# Patient Record
Sex: FEMALE | Race: WHITE | Employment: UNEMPLOYED | URBAN - METROPOLITAN AREA
[De-identification: names, ages, dates, MRNs, and addresses within clinical notes are randomized per-mention and may not be internally consistent; named-entity substitution may affect disease eponyms.]

---

## 2022-11-01 ENCOUNTER — HOSPITAL ENCOUNTER (OUTPATIENT)
Dept: PHYSICAL THERAPY | Age: 62
Discharge: HOME | End: 2022-11-01
Payer: OTHER GOVERNMENT

## 2022-11-01 PROCEDURE — 97530 THERAPEUTIC ACTIVITIES: CPT

## 2022-11-01 PROCEDURE — 97161 PT EVAL LOW COMPLEX 20 MIN: CPT

## 2022-11-01 NOTE — PROGRESS NOTES
[facility] [address] [facility] [address] [place] [address]. [address] [place], [place] [address]  Phone: [phone] Fax: [phone] / STATEMENT OF MEDICAL NECESSITY FOR PHYSICAL THERAPY SERVICES  Patient Name: Desi Almeida : 1960   Medical   Diagnosis: Left hip pain [M25.552]  Right hip pain [M25.551] Treatment Diagnosis: B hip pain   Onset Date:      Referral Source: Niki Agosto MD [facility]): 2022   Prior Hospitalization: See medical history Provider #: [id number]   Prior Level of Function: Walking (new puppy), Gardening, Weight-lifting    Comorbidities: Osteoporosis, Arthritis, Depression, Scoliosis   Medications: Verified on Patient Summary List   The Plan of Care and following information is based on the information from the initial evaluation.   ========================================================================  Assessment / key information:  Pt is a 57 yo female presenting with B hip pain s/sx consistent with greater trochanteric bursitis. Pt presents with pain localized to R greater trochanter, B genu valgus, decr glute strength B, decr B hip flexibility, and pain with laying on R side. Her B hip and knee AROM is WNL and she is (-) for neuro signs and red flags at this time. Pain ranges from 0-6/10, improves with exercise and stretching, but pt unable to pinpoint relieving stretches. Functional deficits at this time include sleeping, prolonged walking, and fear of falling. Pt would benefit from skilled PT to address these deficits to assist pt to full return to PLOF activity. HEP initiated w/ good pt tolerance noted.  Further assessment as follows:    Posture:  Genu valgus B  Lateral Shift:   (-)    Mild L Lumbar scoliotic curve, R shoulder elevated     Gait:    WNL, Independent     Active Movements:   Lumbar, Hip AROM: WNL, painfree      ROM / Strength:                                           AROM Strength (1-5)      Left Right Left Right   Hip Flexion  WNL   WNL  4 4-     Extension       4- 4      Abduction      4- 4      Adduction             IR  35 35   5  5     ER 24   20  5  5   Knee Flexion WNL   WNL  4- 5     Extension  WNL  WNL  5 5    Ankle Plantarflexion             Dorsiflexion     5   5      Neuro Screen:   WNL     Palpation:  TTP at R greater trochanter   Crepitus at R patella with knee AROM     Special Tests:                        Sacroilliac:     Compression:  (-) B                                        Crests: Even height B                          ASIS: Even height B          Hip:            Scour: (-) B  MICHELLE:   (+) L                                                    Piriformis:   ER (+) on L, IR (+) R                              Trendelenburg: (-) B                          Leg Length: equal B                                        Flexibility:   Yimi's:  (-) L  Theodore: (+) L      Ely's: (-) B            Hamstring 90/90: (-) B     Balance:  SLS on floor, EO: R: 18 sec;  L: 30 sec  SLS on floor, EC: R: 2 sec;  L: 3 sec     Other Tests/Comments:  Bridge: 100% no pain, quad dominant  30sec STS: x12, from low plinth, narrow LUIS, mild B DKV, pain in R knee              FOTO: 67/100    ========================================================================  Eval Complexity: History: HIGH Complexity :3+ comorbidities / personal factors will impact the outcome/ POC Exam:HIGH Complexity : 4+ Standardized tests and measures addressing body structure, function, activity limitation and / or participation in recreation  Presentation: LOW Complexity : Stable, uncomplicated  Clinical Decision Making:MEDIUM Complexity : FOTO score of 26-74Overall Complexity:LOW   Problem List: pain affecting function, decrease strength, decrease activity tolerance, and decrease flexibility/ joint mobility    Treatment Plan may include any combination of the following: Therapeutic exercise, Neuromuscular reeducation, Manual therapy, Therapeutic activity, Self care/home management, Electric stim unattended , Gait training, and Ultrasound  Patient / Family readiness to learn indicated by: asking questions, trying to perform skills, and interest  Persons(s) to be included in education: patient (P)  Barriers to Learning/Limitations: None  Measures taken:    Patient Goal (s): \"Increase ROM\"   Patient self reported health status: good  Rehabilitation Potential: excellent    Short Term Goals: To be accomplished in  1  week:   1. Pt will be compliant with initial HEP to improve ability to independently manage symptoms. Status at last assessment: reviewed and initiated HEP    Long Term Goals: To be accomplished in  4 weeks:  1. Pt to report FOTO score of at least 74/100 pts to demonstrate improved function and quality of life. Status at last assessment: 67/100  2. Pt to demo B hip ext and abd strength of 5/5 for improved pelvic stability and ease with transfers, stairs, and weight lifting. Status at last assessment: L: 4-/5, R: 4/5   3. Pt to demo 30 sec STS of greater than 12 reps with proper mechanics and no pain for improved functional LE and hip strength. Status at last assessment: x12, from low plinth, narrow LUIS, mild B DKV, pain in R knee     4. Pt to report pain no greater than 4/10 at worst in order to sleep without interruption. Status at last assessment: 6/10 at worst when laying on R side      Frequency / Duration:   Patient to be seen  1  times per week for 4  treatments:     Patient / Caregiver education and instruction: activity modification and exercises  Therapist Signature: Jose Minaya PT Date: 44/7/7771   Certification Period: na Time: 8:26 AM   ========================================================================  I certify that the above Physical Therapy Services are being furnished while the patient is under my care. I agree with the treatment plan and certify that this therapy is necessary.   Physician Signature:        Date:       Time: ___                                            Myla Auguste MD.    Please sign and return to [facility] or you may fax the signed copy to [phone]. Thank you.

## 2022-11-01 NOTE — PROGRESS NOTES
PT DAILY TREATMENT NOTE/LUMBAR EVAL     Patient Name: Luis Carlos Mcelroy  Date:2022  : 1960  [x]  Patient  Verified  Payor:  / Plan: Donte Gaytan 74 / Product Type:  /    In time:1455  Out time:1556  Total Treatment Time (min): 61  Visit #: 1 of 4    Treatment Area:   Left hip pain [M25.552]  Right hip pain [M25.551]     SUBJECTIVE    CC: B hip pain, pain in R knee  Pain: Current: 2/10 Best: 0/10 Worst: 6/10   Alleviated By: rolling out, exercise, stretching   Aggravated By: weather, stretching   PLOF: Walking (new puppy), Gardening, Weight-lifting   Recent Falls: Yes, dog ran into, fell onto bottom  Mechanism of Injury: Insidious  Current symptoms/Complaints:  Worsening hip pain over last 1-2 years, joint stiffness, decr ROM, pain worse in morning  Previous Treatment/Compliance: PT  PMHx/Surgical Hx: L tibial realignment, surgery for torn ligaments, dislocated knee caps (\"It's been awhile\")  Diagnostic Tests: [] Lab work [x] X-rays    [] CT [] MRI     [] Other:   Results: R hip \"a couple years ago, impingement, did stretching\"  Work Hx: Retired  Living Situation: 1 flight to enter, 2 flights in the home, lives with , uses handrail   Pt Goals: \"increase ROM\"       Modality rationale: decrease inflammation and decrease pain to improve the patients ability to walk    Min Type Additional Details    [] Estim:  []Unatt       []IFC  []Premod                        []Other:  []w/ice   []w/heat  Position:  Location:    [] Estim: []Att    []TENS instruct  []NMES                    []Other:  []w/US   []w/ice   []w/heat  Position:  Location:    []  Traction: [] Cervical       []Lumbar                       [] Prone          []Supine                       []Intermittent   []Continuous Lbs:  [] before manual  [] after manual    []  Ultrasound: []Continuous   [] Pulsed                           []1MHz   []3MHz Location:  W/cm2:    []  Iontophoresis with dexamethasone         Location: [] Take home patch   [] In clinic   10 [x]  Ice to R hip    [x]  Heat to R thigh  []  Ice massage  []  Laser   []  Anodyne Position: Semi-reclined  Location:    []  Laser with stim  []  Other: Position:  Location:    []  Vasopneumatic Device Pressure:       [] lo [] med [] hi   Temperature: [] lo [] med [] hi   [x] Skin assessment post-treatment:  [x]intact []redness- no adverse reaction    []redness - adverse reaction:     39 min [x]Eval                  []Re-Eval     12 min Therapeutic Activity:  [x]  See flow sheet :   Rationale: increase ROM and increase strength  to improve the patients ability to walk her dog and perform transfers            Throughout Patient Education:   Pt educated on diagnosis and prognosis, current impairments, postural re-education, goals/role/intent of therapy, and importance of compliance with HEP. Written HEP provided to patient and PT instructed pt on performance. Pt demo good understanding and recall. PT addressed pt questions/concerns regarding their condition. Return to gym, OK to weightlift, use stair climber and incline TM. Avoid quad dominant activity    Ice to R hip to decr bursitis-related pain sx after prolonged walking         General Health:  Red Flags Indicated?  [] Yes    [x] No      OBJECTIVE    Posture:  Genu valgus B,   Lateral Shift:   (-)    Mild L Lumbar scoliotic curve, R shoulder elevated     Gait:    WNL, Independent     Active Movements:   Lumbar, Hip AROM: WNL, painfree      ROM / Strength:                                           AROM                               Strength (1-5)      Left Right Left Right   Hip Flexion  WNL   WNL  4 4-     Extension       4- 4      Abduction      4- 4      Adduction             IR  35 35   5  5     ER 24   20  5  5   Knee Flexion WNL   WNL  4- 5     Extension  WNL  WNL  5 5    Ankle Plantarflexion             Dorsiflexion     5   5      Neuro Screen:   WNL     Palpation:  TTP at R greater trochanter   Crepitus at R patella with knee AROM     Special Tests:                        Sacroilliac:     Compression:  (-) B                                        Crests: Even height B                          ASIS: Even height B          Hip:            Scour: (-) B  MICHELLE:   (+) L                                                    Piriformis:   ER (+) on L, IR (+) R                              Trendelenburg: (-) B                          Leg Length: equal B                                        Flexibility:   Yimi's:  (-) L  Theodore: (+) L      Ely's: (-) B            Hamstring 90/90: (-) B     Balance:  SLS on floor, EO: R: 18 sec;  L: 30 sec  SLS on floor, EC: R: 2 sec;  L: 3 sec     Other Tests/Comments:  Bridge: 100% no pain, quad dominant  30sec STS: x12, from low plinth, narrow LUIS, mild B DKV, pain in R knee              FOTO: 67/100    Pain Level (0-10 scale) post treatment: 0/10    ASSESSMENT/Changes in Function:   Please see POC    Patient will continue to benefit from skilled PT services to modify and progress therapeutic interventions, address functional mobility deficits, address ROM deficits, address strength deficits, analyze and address soft tissue restrictions, analyze and cue movement patterns, analyze and modify body mechanics/ergonomics, assess and modify postural abnormalities, address imbalance/dizziness, and instruct in home and community integration to attain remaining goals.      [x]  See Plan of Care   []  See progress note/recertification  []  See Discharge Summary         Progress towards goals / Updated goals:  Please see POC    Justification for Eval Code Complexity:  Patient History : see above  Examination: see exam  Clinical Presentation:  stable  Clinical Decision Making : FOTO : 67 /100      PLAN  [x]  Upgrade activities as tolerated     [x]  Continue plan of care  []  Update interventions per flow sheet       []  Discharge due to:_  [x]  Other:_   POC 1x/wk for 4 weeks    Karis Schlatter, PT 11/1/2022  8:01 AM

## 2022-11-07 ENCOUNTER — HOSPITAL ENCOUNTER (OUTPATIENT)
Dept: PHYSICAL THERAPY | Age: 62
Discharge: HOME | End: 2022-11-07
Payer: OTHER GOVERNMENT

## 2022-11-07 PROCEDURE — 97116 GAIT TRAINING THERAPY: CPT

## 2022-11-07 PROCEDURE — 97112 NEUROMUSCULAR REEDUCATION: CPT

## 2022-11-07 PROCEDURE — 97530 THERAPEUTIC ACTIVITIES: CPT

## 2022-11-07 PROCEDURE — 97110 THERAPEUTIC EXERCISES: CPT

## 2022-11-07 NOTE — PROGRESS NOTES
PT DAILY TREATMENT NOTE 8    Patient Name: Jose Echevarria  Date:2022  : 1960  [x]  Patient  Verified  Payor: MARGRET / Plan: Donte Gaytan 74 / Product Type:  /    In time: 8480  Out time:1455  Total Treatment Time (min): 70  Visit #: 2 of 4    Treatment Area:   Left hip pain [M25.552]  Right hip pain [M25.551]    SUBJECTIVE  Pain Level (0-10 scale): 1/10 on R hip   Any medication changes, allergies to medications, adverse drug reactions, diagnosis change, or new procedure performed?: [x] No    [] Yes (see summary sheet for update)  Subjective functional status/changes:   [] No changes reported  \"I have been icing it every other day as much as I don't like to. When I am coming in from walking, I do the stretches because I think it's the walking that gets me. I have not done any biking or elliptical this week. \"    OBJECTIVE    Modality rationale: decrease pain and increase tissue extensibility to improve the patients ability to perform ADLs and rest comfortably following therapy.    Min Type Additional Details     [] Estim:  []Unatt       []IFC  []Premod                        []Other: []w/ice   []w/heat  Position:   Location:      [] Estim: []Att    []TENS instruct  []NMES                    []Other: []w/US   []w/ice   []w/heat  Position:  Location:      []  Traction: [] Cervical       []Lumbar                       [] Prone          []Supine                       []Intermittent   []Continuous Lbs:  [] before manual  [] after manual      []  Ultrasound: []Continuous   [] Pulsed                           []1MHz   []3MHz W/cm2:  Location:      []  Iontophoresis with dexamethasone        Location: [] Take home patch  [] In clinic     10 [x]  Ice     []  heat  []  Ice massage  []  Laser  []  Anodyne Position: semi-reclined  Location: R hip       []  Laser with stim  []  Other: Position:  Location:      []  Vasopneumatic Device    []  Right     []  Left  Pre-treatment girth:  Post-treatment girth:  Measured at (location): Pressure:       [] lo [] med [] hi  Temperature: [] lo [] med [] hi    [x] Skin assessment post-treatment:  [x]intact []redness- no adverse reaction    []redness - adverse reaction    14 min Therapeutic Exercise:  [x] See flow sheet :   Rationale: increase ROM and increase strength to improve LE strength/mobility to improve ease of ADLs, household chores, and gait. 13 min Therapeutic Activity:  [x]  See flow sheet :   S/l squats at high plinth   Rationale: increase strength, improve coordination, improve balance, and increase proprioception to improve the patient's ability to perform transfers, stair negotiation. 25 min Neuromuscular Re-education:  [x]  See flow sheet :  Glute strengthening   Rationale: increase strength, improve coordination, improve balance and increase proprioception to improve the patients ability to perform functional tasks with improved hip/knee stability and decreased risk for falls. 8 min Gait Trainin feet x 2 of following, with RTB  [] Banded March   [x] Banded Lateral     [] Vertical HT  [x] Banded Retrograde    [] Banded Monster   [] Dual Task  [] Hurdles          [] Wander Uyen                    [] Ladder Drills  [] Heel Walk      [] Toe Walk   Rationale: improve coordination and gait mechanics to normalize gait pattern for safety with household/community ambulation                                                                    Throughout Patient Education: [x] Review HEP    Difference between pain and discomfort, importance of stopping prior to incr in pain levels  Potential for DOMs w/ new activity and importance active recovery with walking and stretching to address. Watch for fatigue during HEP; decrease weight, reps or take extended rest break to avoid compensation.           Other Objective/Functional Measures:   Initiated POC per FS     Pain Level (0-10 scale) post treatment: 0    ASSESSMENT/Changes in Function:   Pt seen for 1st treatment since IE. She demonstrates excellent ability to integrate cues into all interventions, and able to independently correct for technique based on mechanics. Plan to progress POC NV for ankle and hip stability and strengthening and functional mobility. Patient will continue to benefit from skilled PT services to modify and progress therapeutic interventions, address functional mobility deficits, address ROM deficits, address strength deficits, analyze and address soft tissue restrictions, analyze and cue movement patterns, analyze and modify body mechanics/ergonomics, assess and modify postural abnormalities, address imbalance/dizziness, and instruct in home and community integration to attain remaining goals. []  See Plan of Care  []  See progress note/recertification  []  See Discharge Summary         PN due 12/1    Progress towards goals / Updated goals:     Short Term Goals: To be accomplished in  1  week:   1. Pt will be compliant with initial HEP to improve ability to independently manage symptoms. Status at last assessment: reviewed and initiated HEP   Current 11/7/22 goal met, pt notes daily compliance  Long Term Goals: To be accomplished in  4 weeks:  1. Pt to report FOTO score of at least 74/100 pts to demonstrate improved function and quality of life. Status at last assessment: 67/100  2. Pt to demo B hip ext and abd strength of 5/5 for improved pelvic stability and ease with transfers, stairs, and weight lifting. Status at last assessment: L: 4-/5, R: 4/5   3. Pt to demo 30 sec STS of greater than 12 reps with proper mechanics and no pain for improved functional LE and hip strength. Status at last assessment: x12, from low plinth, narrow LUIS, mild B DKV, pain in R knee     4. Pt to report pain no greater than 4/10 at worst in order to sleep without interruption.   Status at last assessment: 6/10 at worst when laying on R side         PLAN  [x]  Upgrade activities as tolerated     [x]  Continue plan of care  []  Update interventions per flow sheet       []  Discharge due to:_  []  Other:_      Lynsey Hamilton PT 11/7/2022  8:34 AM    Future Appointments   Date Time Provider Tana Chavez   11/7/2022  2:45 PM Latanya Tellez PT [id number]   11/15/2022  2:00 PM KIMBERLY Gross [facility]